# Patient Record
Sex: MALE | Race: BLACK OR AFRICAN AMERICAN | NOT HISPANIC OR LATINO | URBAN - METROPOLITAN AREA
[De-identification: names, ages, dates, MRNs, and addresses within clinical notes are randomized per-mention and may not be internally consistent; named-entity substitution may affect disease eponyms.]

---

## 2020-07-25 ENCOUNTER — TELEPHONE ENCOUNTER (OUTPATIENT)
Dept: URBAN - METROPOLITAN AREA CLINIC 13 | Facility: CLINIC | Age: 60
End: 2020-07-25

## 2020-07-26 ENCOUNTER — TELEPHONE ENCOUNTER (OUTPATIENT)
Dept: URBAN - METROPOLITAN AREA CLINIC 13 | Facility: CLINIC | Age: 60
End: 2020-07-26

## 2020-07-26 RX ORDER — FUROSEMIDE 40 MG/1
TAKE 1 TABLET AS NEEDED TABLET ORAL
Refills: 0 | Status: ACTIVE | COMMUNITY

## 2020-07-26 RX ORDER — CLONIDINE HYDROCHLORIDE 0.1 MG/1
TAKE 1 TABLET BEDTIME TABLET ORAL
Refills: 0 | Status: ACTIVE | COMMUNITY

## 2020-07-26 RX ORDER — BISOPROLOL/HYDROCHLOROTHIAZIDE 10-6.25 MG
TAKE 1 TABLET DAILY TABLET ORAL
Refills: 0 | Status: ACTIVE | COMMUNITY

## 2020-07-26 RX ORDER — LOSARTAN POTASSIUM 100 MG/1
TAKE 1 TABLET ONCE DAILY TABLET, FILM COATED ORAL
Refills: 0 | Status: ACTIVE | COMMUNITY

## 2021-10-11 ENCOUNTER — OFFICE VISIT (OUTPATIENT)
Dept: URBAN - METROPOLITAN AREA CLINIC 113 | Facility: CLINIC | Age: 61
End: 2021-10-11
Payer: COMMERCIAL

## 2021-10-11 VITALS
WEIGHT: 315 LBS | TEMPERATURE: 97.3 F | HEART RATE: 80 BPM | SYSTOLIC BLOOD PRESSURE: 153 MMHG | RESPIRATION RATE: 22 BRPM | DIASTOLIC BLOOD PRESSURE: 91 MMHG | BODY MASS INDEX: 41.75 KG/M2 | HEIGHT: 73 IN

## 2021-10-11 DIAGNOSIS — Z86.010 HISTORY OF ADENOMATOUS POLYP OF COLON: ICD-10-CM

## 2021-10-11 PROCEDURE — 99203 OFFICE O/P NEW LOW 30 MIN: CPT | Performed by: NURSE PRACTITIONER

## 2021-10-11 RX ORDER — FUROSEMIDE 40 MG/1
TAKE 1 TABLET AS NEEDED TABLET ORAL
Refills: 0 | Status: ON HOLD | COMMUNITY

## 2021-10-11 RX ORDER — BISOPROLOL/HYDROCHLOROTHIAZIDE 10-6.25 MG
TAKE 1 TABLET DAILY TABLET ORAL
Refills: 0 | Status: ON HOLD | COMMUNITY

## 2021-10-11 RX ORDER — LOSARTAN POTASSIUM 100 MG/1
TAKE 1 TABLET ONCE DAILY TABLET, FILM COATED ORAL
Refills: 0 | Status: ON HOLD | COMMUNITY

## 2021-10-11 RX ORDER — CLONIDINE HYDROCHLORIDE 0.1 MG/1
TAKE 1 TABLET BEDTIME TABLET ORAL
Refills: 0 | Status: ACTIVE | COMMUNITY

## 2021-10-11 RX ORDER — SODIUM PICOSULFATE, MAGNESIUM OXIDE, AND ANHYDROUS CITRIC ACID 10; 3.5; 12 MG/160ML; G/160ML; G/160ML
160 ML LIQUID ORAL
Qty: 320 MILLILITER | Refills: 0 | OUTPATIENT
Start: 2021-10-11 | End: 2021-10-12

## 2021-10-11 RX ORDER — OLMESARTAN MEDOXOMIL, AMLODIPINE AND HYDROCHLOROTHIAZIDE 40; 10; 25 MG/1; MG/1; MG/1
1 TABLET TABLET, FILM COATED ORAL ONCE A DAY
Status: ACTIVE | COMMUNITY

## 2021-10-11 RX ORDER — GEMFIBROZIL 600 MG/1
1 TABLET 30 MINUTES BEFORE MORNING AND EVENING MEALS TABLET ORAL TWICE A DAY
Status: ACTIVE | COMMUNITY

## 2021-10-11 NOTE — HPI-TODAY'S VISIT:
60-year-old male with a history of hypertension, peripheral vascular disease, GERD, adenomatous colon polyps, presenting to discuss colonoscopy. Screening colonoscopy 5/19/2016 by Dr. Mills:two 1 to 2 mm tubular adenomas in the ascending colon, one 2mm from plastic in the transverse colon, one 8mm tubulovillous adenoma in the descending colon, one 2mm hyperplastic polyp in the rectum, nonbleeding internal hemorrhoids.  Repeat colonoscopy recommended in 3 years for surveillance.  He is asymptomatic from a GI standpoint, denying abdominal pain, nausea, vomiting, change in bowel habits, or blood per rectum. There is no family history of GI malignancy.

## 2021-10-12 PROBLEM — 429047008 HISTORY OF ADENOMATOUS POLYP OF COLON: Status: ACTIVE | Noted: 2021-10-11

## 2021-10-27 ENCOUNTER — OFFICE VISIT (OUTPATIENT)
Dept: URBAN - METROPOLITAN AREA SURGERY CENTER 25 | Facility: SURGERY CENTER | Age: 61
End: 2021-10-27
Payer: COMMERCIAL

## 2021-10-27 DIAGNOSIS — D12.6 COLON ADENOMAS: ICD-10-CM

## 2021-10-27 DIAGNOSIS — Z86.010 ADENOMAS PERSONAL HISTORY OF COLONIC POLYPS: ICD-10-CM

## 2021-10-27 PROCEDURE — 45385 COLONOSCOPY W/LESION REMOVAL: CPT | Performed by: INTERNAL MEDICINE

## 2021-10-27 PROCEDURE — G8907 PT DOC NO EVENTS ON DISCHARG: HCPCS | Performed by: INTERNAL MEDICINE

## 2021-10-27 RX ORDER — LOSARTAN POTASSIUM 100 MG/1
TAKE 1 TABLET ONCE DAILY TABLET, FILM COATED ORAL
Refills: 0 | Status: ON HOLD | COMMUNITY

## 2021-10-27 RX ORDER — CLONIDINE HYDROCHLORIDE 0.1 MG/1
TAKE 1 TABLET BEDTIME TABLET ORAL
Refills: 0 | Status: ACTIVE | COMMUNITY

## 2021-10-27 RX ORDER — GEMFIBROZIL 600 MG/1
1 TABLET 30 MINUTES BEFORE MORNING AND EVENING MEALS TABLET ORAL TWICE A DAY
Status: ACTIVE | COMMUNITY

## 2021-10-27 RX ORDER — BISOPROLOL/HYDROCHLOROTHIAZIDE 10-6.25 MG
TAKE 1 TABLET DAILY TABLET ORAL
Refills: 0 | Status: ON HOLD | COMMUNITY

## 2021-10-27 RX ORDER — OLMESARTAN MEDOXOMIL, AMLODIPINE AND HYDROCHLOROTHIAZIDE 40; 10; 25 MG/1; MG/1; MG/1
1 TABLET TABLET, FILM COATED ORAL ONCE A DAY
Status: ACTIVE | COMMUNITY

## 2021-10-27 RX ORDER — FUROSEMIDE 40 MG/1
TAKE 1 TABLET AS NEEDED TABLET ORAL
Refills: 0 | Status: ON HOLD | COMMUNITY

## 2021-11-19 ENCOUNTER — DASHBOARD ENCOUNTERS (OUTPATIENT)
Age: 61
End: 2021-11-19

## 2021-11-19 ENCOUNTER — OFFICE VISIT (OUTPATIENT)
Dept: URBAN - METROPOLITAN AREA CLINIC 113 | Facility: CLINIC | Age: 61
End: 2021-11-19
Payer: COMMERCIAL

## 2021-11-19 ENCOUNTER — WEB ENCOUNTER (OUTPATIENT)
Dept: URBAN - METROPOLITAN AREA CLINIC 113 | Facility: CLINIC | Age: 61
End: 2021-11-19

## 2021-11-19 VITALS
WEIGHT: 315 LBS | BODY MASS INDEX: 41.75 KG/M2 | DIASTOLIC BLOOD PRESSURE: 91 MMHG | HEART RATE: 91 BPM | TEMPERATURE: 98.2 F | HEIGHT: 73 IN | SYSTOLIC BLOOD PRESSURE: 191 MMHG

## 2021-11-19 DIAGNOSIS — D36.9 TUBULAR ADENOMA: ICD-10-CM

## 2021-11-19 DIAGNOSIS — K64.8 INTERNAL HEMORRHOIDS WITHOUT COMPLICATION: ICD-10-CM

## 2021-11-19 PROBLEM — 444408007: Status: ACTIVE | Noted: 2021-11-19

## 2021-11-19 PROBLEM — 38214006: Status: ACTIVE | Noted: 2021-11-19

## 2021-11-19 PROCEDURE — 99213 OFFICE O/P EST LOW 20 MIN: CPT | Performed by: INTERNAL MEDICINE

## 2021-11-19 RX ORDER — GEMFIBROZIL 600 MG/1
1 TABLET 30 MINUTES BEFORE MORNING AND EVENING MEALS TABLET ORAL TWICE A DAY
Status: ACTIVE | COMMUNITY

## 2021-11-19 RX ORDER — CLONIDINE HYDROCHLORIDE 0.1 MG/1
TAKE 1 TABLET BEDTIME TABLET ORAL
Refills: 0 | Status: ACTIVE | COMMUNITY

## 2021-11-19 RX ORDER — OLMESARTAN MEDOXOMIL, AMLODIPINE AND HYDROCHLOROTHIAZIDE 40; 10; 25 MG/1; MG/1; MG/1
1 TABLET TABLET, FILM COATED ORAL ONCE A DAY
Status: ACTIVE | COMMUNITY

## 2021-11-19 NOTE — HPI-TODAY'S VISIT:
Mr. Cervantes is a 60-year-old obese male with a history of hypertension, peripheral vascular disease and adenomatous colon polyps here for colonoscopy follow up.  Colonoscopy 10/27/21 revealed 3 sessile polyps removed from the transverse colon, ascending colon and cecum ranging in size from 2-3 mm, otherwise normal colon and small internal hemorrhoids.  The polyps were all tubular adenomas and was recommended to repeat in 5 years.  He has rare episodes  of rectal bleeding only when he wipes.  He denies blood in the stooll, nausea, vomiting or abdominal pain.  Screening colonoscopy 5/19/2016 by Dr. Mills:  two 1 to 2 mm tubular adenomas in the ascending colon, one 2mm from plastic in the transverse colon, one 8mm tubulovillous adenoma in the descending colon, one 2mm hyperplastic polyp in the rectum, nonbleeding internal hemorrhoids.  Repeat colonoscopy recommended in 3 years for surveillance.